# Patient Record
Sex: FEMALE | URBAN - METROPOLITAN AREA
[De-identification: names, ages, dates, MRNs, and addresses within clinical notes are randomized per-mention and may not be internally consistent; named-entity substitution may affect disease eponyms.]

---

## 2020-02-04 ENCOUNTER — OFFICE VISIT (OUTPATIENT)
Dept: FAMILY MEDICINE CLINIC | Age: 20
End: 2020-02-04
Payer: COMMERCIAL

## 2020-02-04 VITALS
HEART RATE: 84 BPM | DIASTOLIC BLOOD PRESSURE: 74 MMHG | OXYGEN SATURATION: 98 % | BODY MASS INDEX: 27.31 KG/M2 | SYSTOLIC BLOOD PRESSURE: 118 MMHG | RESPIRATION RATE: 16 BRPM | WEIGHT: 160 LBS | HEIGHT: 64 IN

## 2020-02-04 PROCEDURE — 99385 PREV VISIT NEW AGE 18-39: CPT | Performed by: FAMILY MEDICINE

## 2020-02-04 SDOH — HEALTH STABILITY: MENTAL HEALTH: HOW OFTEN DO YOU HAVE A DRINK CONTAINING ALCOHOL?: NEVER

## 2020-02-04 ASSESSMENT — PATIENT HEALTH QUESTIONNAIRE - PHQ9
2. FEELING DOWN, DEPRESSED OR HOPELESS: 0
1. LITTLE INTEREST OR PLEASURE IN DOING THINGS: 0
SUM OF ALL RESPONSES TO PHQ QUESTIONS 1-9: 0
SUM OF ALL RESPONSES TO PHQ QUESTIONS 1-9: 0
SUM OF ALL RESPONSES TO PHQ9 QUESTIONS 1 & 2: 0

## 2020-02-04 ASSESSMENT — ENCOUNTER SYMPTOMS
EYES NEGATIVE: 1
ALLERGIC/IMMUNOLOGIC NEGATIVE: 1
GASTROINTESTINAL NEGATIVE: 1
RESPIRATORY NEGATIVE: 1

## 2020-02-04 NOTE — PATIENT INSTRUCTIONS
exposed skin. · See a dentist one or two times a year for checkups and to have your teeth cleaned. · Wear a seat belt in the car. Follow your doctor's advice about when to have certain tests. These tests can spot problems early. For everyone  · Cholesterol. Have the fat (cholesterol) in your blood tested after age 21. Your doctor will tell you how often to have this done based on your age, family history, or other things that can increase your risk for heart disease. · Blood pressure. Have your blood pressure checked during a routine doctor visit. Your doctor will tell you how often to check your blood pressure based on your age, your blood pressure results, and other factors. · Vision. Talk with your doctor about how often to have a glaucoma test.  · Diabetes. Ask your doctor whether you should have tests for diabetes. · Colon cancer. Your risk for colorectal cancer gets higher as you get older. Some experts say that adults should start regular screening at age 48 and stop at age 76. Others say to start before age 48 or continue after age 76. Talk with your doctor about your risk and when to start and stop screening. For women  · Breast exam and mammogram. Talk to your doctor about when you should have a clinical breast exam and a mammogram. Medical experts differ on whether and how often women under 50 should have these tests. Your doctor can help you decide what is right for you. · Cervical cancer screening test and pelvic exam. Begin with a Pap test at age 24. The test often is part of a pelvic exam. Starting at age 27, you may choose to have a Pap test, an HPV test, or both tests at the same time (called co-testing). Talk with your doctor about how often to have testing. · Tests for sexually transmitted infections (STIs). Ask whether you should have tests for STIs. You may be at risk if you have sex with more than one person, especially if your partners do not wear condoms.   For men  · Tests for sexually transmitted infections (STIs). Ask whether you should have tests for STIs. You may be at risk if you have sex with more than one person, especially if you do not wear a condom. · Testicular cancer exam. Ask your doctor whether you should check your testicles regularly. · Prostate exam. Talk to your doctor about whether you should have a blood test (called a PSA test) for prostate cancer. Experts differ on whether and when men should have this test. Some experts suggest it if you are older than 39 and are -American or have a father or brother who got prostate cancer when he was younger than 72. When should you call for help? Watch closely for changes in your health, and be sure to contact your doctor if you have any problems or symptoms that concern you. Where can you learn more? Go to [email or web address] and sign in to your [employer] account. Enter P072 in the Xention box to learn more about \"Well Visit, Ages 25 to 48: Care Instructions. \"     If you do not have an account, please click on the \"Sign Up Now\" link. Current as of: August 21, 2019  Content Version: 12.3  © 1561-9890 Healthwise, Incorporated. Care instructions adapted under license by [facility] [place] ([facility]). If you have questions about a medical condition or this instruction, always ask your healthcare professional. [address] any warranty or liability for your use of this information. Thank you for enrolling in [address] Please follow the instructions below to securely access your online medical record. [employer] allows you to send messages to your doctor, view your test results, renew your prescriptions, schedule appointments, and more. How Do I Sign Up? 1. In your Internet browser, go to [email or web address]  2. Click on the Sign Up Now link in the Sign In box. You will see the New Member Sign Up page.   3. Enter your [employer] Access Code exactly as it appears below. You will not need to use this code after youve completed the sign-up process. If you do not sign up before the expiration date, you must request a new code. [employer] Access Code: [email or web address]  Expires: 3/20/2020 10:35 AM    4. Enter your Social Security Number ([id number]) and Date of Birth (mm/dd/yyyy) as indicated and click Submit. You will be taken to the next sign-up page. 5. Create a [employer] ID. This will be your [employer] login ID and cannot be changed, so think of one that is secure and easy to remember. 6. Create a [employer] password. You can change your password at any time. 7. Enter your Password Reset Question and Answer. This can be used at a later time if you forget your password. 8. Enter your e-mail address. You will receive e-mail notification when new information is available in [address] 9. Click Sign Up. You can now view your medical record. Additional Information  If you have questions, please contact your physician practice where you receive care. Remember, [employer] is NOT to be used for urgent needs. For medical emergencies, dial 911.

## 2020-02-04 NOTE — PROGRESS NOTES
2020    Jeanine Severin (:  2000) is a 23 y.o. female, here for a preventive medicine evaluation. Patient is here today to establish care. She has no complaints or concerns. Student at [employer], studying criminal justice forensics. Also works part-time at her [employer] course. There is no problem list on file for this patient. Review of Systems   Constitutional: Negative. HENT: Negative. Eyes: Negative. Respiratory: Negative. Cardiovascular: Negative. Gastrointestinal: Negative. Endocrine: Negative. Genitourinary: Negative. Musculoskeletal: Negative. Skin: Negative. Allergic/Immunologic: Negative. Neurological: Negative. Hematological: Negative. Psychiatric/Behavioral: Negative.         Prior to Visit Medications    Not on File        No Known Allergies    Past Medical History:   Diagnosis Date    Allergic rhinitis        Past Surgical History:   Procedure Laterality Date    CYST REMOVAL      Ear    CYST REMOVAL      cyst removal from ear       Social History     Socioeconomic History    Marital status: Not on file     Spouse name: Not on file    Number of children: Not on file    Years of education: Not on file    Highest education level: Not on file   Occupational History    Not on file   Social Needs    Financial resource strain: Not on file    Food insecurity:     Worry: Not on file     Inability: Not on file    Transportation needs:     Medical: Not on file     Non-medical: Not on file   Tobacco Use    Smoking status: Never Smoker    Smokeless tobacco: Never Used   Substance and Sexual Activity    Alcohol use: Never     Frequency: Never    Drug use: Never    Sexual activity: Yes     Partners: Female   Lifestyle    Physical activity:     Days per week: Not on file     Minutes per session: Not on file    Stress: Not on file   Relationships    Social connections:     Talks on phone: Not on file     Gets together: Not on file     Attends Yarsani service: Not on file     Active member of club or organization: Not on file     Attends meetings of clubs or organizations: Not on file     Relationship status: Not on file    Intimate partner violence:     Fear of current or ex partner: Not on file     Emotionally abused: Not on file     Physically abused: Not on file     Forced sexual activity: Not on file   Other Topics Concern    Not on file   Social History Narrative    Not on file        Family History   Problem Relation Age of Onset    High Blood Pressure Father     High Cholesterol Father     Other Father         anxiety, prediabetes    Kidney Disease Paternal Grandmother        ADVANCE DIRECTIVE: N, Not Received    Vitals:    02/04/20 1004   BP: 118/74   Site: Right Upper Arm   Position: Sitting   Cuff Size: Medium Adult   Pulse: 84   Resp: 16   SpO2: 98%   Weight: 160 lb (72.6 kg)   Height: 5' 4\" (1.626 m)     Estimated body mass index is 27.46 kg/m² as calculated from the following:    Height as of this encounter: 5' 4\" (1.626 m). Weight as of this encounter: 160 lb (72.6 kg). Physical Exam  Vitals signs and nursing note reviewed. Constitutional:       General: She is not in acute distress. Appearance: Normal appearance. She is well-developed. HENT:      Head: Normocephalic and atraumatic. Right Ear: Hearing, tympanic membrane, ear canal and external ear normal.      Left Ear: Hearing, tympanic membrane, ear canal and external ear normal.      Nose: Nose normal.      Mouth/Throat:      Pharynx: Uvula midline. Eyes:      General: Lids are normal. No scleral icterus. Conjunctiva/sclera: Conjunctivae normal.      Pupils: Pupils are equal, round, and reactive to light. Neck:      Musculoskeletal: Neck supple. Trachea: Trachea normal.   Cardiovascular:      Rate and Rhythm: Normal rate and regular rhythm. Pulses: Normal pulses. Heart sounds: Normal heart sounds.    Pulmonary:

## 2021-07-09 ENCOUNTER — OFFICE VISIT (OUTPATIENT)
Dept: FAMILY MEDICINE CLINIC | Age: 21
End: 2021-07-09
Payer: COMMERCIAL

## 2021-07-09 VITALS
HEIGHT: 62 IN | SYSTOLIC BLOOD PRESSURE: 90 MMHG | BODY MASS INDEX: 30.69 KG/M2 | WEIGHT: 166.8 LBS | OXYGEN SATURATION: 99 % | HEART RATE: 76 BPM | DIASTOLIC BLOOD PRESSURE: 60 MMHG | RESPIRATION RATE: 18 BRPM

## 2021-07-09 DIAGNOSIS — M54.10 RADICULOPATHY OF ARM: ICD-10-CM

## 2021-07-09 DIAGNOSIS — M54.2 NECK PAIN: Primary | ICD-10-CM

## 2021-07-09 PROCEDURE — 99213 OFFICE O/P EST LOW 20 MIN: CPT | Performed by: NURSE PRACTITIONER

## 2021-07-09 RX ORDER — DICLOFENAC SODIUM 75 MG/1
75 TABLET, DELAYED RELEASE ORAL 2 TIMES DAILY
Qty: 30 TABLET | Refills: 0 | Status: SHIPPED | OUTPATIENT
Start: 2021-07-09

## 2021-07-09 SDOH — ECONOMIC STABILITY: FOOD INSECURITY: WITHIN THE PAST 12 MONTHS, YOU WORRIED THAT YOUR FOOD WOULD RUN OUT BEFORE YOU GOT MONEY TO BUY MORE.: NEVER TRUE

## 2021-07-09 SDOH — ECONOMIC STABILITY: FOOD INSECURITY: WITHIN THE PAST 12 MONTHS, THE FOOD YOU BOUGHT JUST DIDN'T LAST AND YOU DIDN'T HAVE MONEY TO GET MORE.: NEVER TRUE

## 2021-07-09 ASSESSMENT — PATIENT HEALTH QUESTIONNAIRE - PHQ9
2. FEELING DOWN, DEPRESSED OR HOPELESS: 0
SUM OF ALL RESPONSES TO PHQ9 QUESTIONS 1 & 2: 0
1. LITTLE INTEREST OR PLEASURE IN DOING THINGS: 0
SUM OF ALL RESPONSES TO PHQ QUESTIONS 1-9: 0

## 2021-07-09 ASSESSMENT — SOCIAL DETERMINANTS OF HEALTH (SDOH): HOW HARD IS IT FOR YOU TO PAY FOR THE VERY BASICS LIKE FOOD, HOUSING, MEDICAL CARE, AND HEATING?: NOT HARD AT ALL

## 2021-07-09 NOTE — PROGRESS NOTES
Benjamin Ramires (:  2000) is a 21 y.o. female,Established patient, here for evaluation of the following chief complaint(s):  Neck Pain (L side) and Arm Pain (L side)         ASSESSMENT/PLAN:  1. Neck pain  -     diclofenac (VOLTAREN) 75 MG EC tablet; Take 1 tablet by mouth 2 times daily, Disp-30 tablet, R-0Normal    Apply ice pack 4 times daily for 15-20 minutes. Wrap in towel, etc to avoid the coldness directly to the skin. Will treat musculoskeletal pain. Discussed need to avoid using vape pen. Return if no improvement or worsens      No follow-ups on file. Subjective   SUBJECTIVE/OBJECTIVE:  HPI     Went to [facility] and on drive there started with left neck pain. Yesterday watching movie and suppressed a sneeze and weird pain, \"like weird stunt\". Having sensations of pinching on left neck. Sensation worsens after using her vape. Has used for about 1 year. Has taken ibuprofen few time, relief briefly. Having some anxiety and when increased throat feels like Marc Velasquez is going to cry\" but she is not crying. Denies CP, SOB    Vague about symptoms, has a hard time describing. Review of Systems   All other systems reviewed and are negative. Objective   Physical Exam  Constitutional:       Appearance: Normal appearance. Cardiovascular:      Pulses: Normal pulses. Pulmonary:      Effort: Pulmonary effort is normal.      Breath sounds: Normal breath sounds. Musculoskeletal:         General: Normal range of motion. Skin:     General: Skin is warm. Neurological:      Mental Status: She is alert and oriented to person, place, and time.       Comments: 2+ brachial reflexes bilateral. ROM and hand grasp equal.                   An electronic signature was used to authenticate this note.    --RUBINA BURNETT, APRN - CNP

## 2022-06-02 ENCOUNTER — OFFICE VISIT (OUTPATIENT)
Dept: FAMILY MEDICINE CLINIC | Age: 22
End: 2022-06-02
Payer: COMMERCIAL

## 2022-06-02 VITALS
BODY MASS INDEX: 29.45 KG/M2 | SYSTOLIC BLOOD PRESSURE: 98 MMHG | DIASTOLIC BLOOD PRESSURE: 70 MMHG | OXYGEN SATURATION: 98 % | WEIGHT: 161 LBS | HEART RATE: 103 BPM

## 2022-06-02 DIAGNOSIS — M54.2 NECK PAIN ON LEFT SIDE: ICD-10-CM

## 2022-06-02 DIAGNOSIS — R20.2 ARM PARESTHESIA, LEFT: Primary | ICD-10-CM

## 2022-06-02 DIAGNOSIS — R29.898 LEFT ARM WEAKNESS: ICD-10-CM

## 2022-06-02 PROCEDURE — 99214 OFFICE O/P EST MOD 30 MIN: CPT | Performed by: FAMILY MEDICINE

## 2022-06-02 RX ORDER — METHYLPREDNISOLONE 4 MG/1
TABLET ORAL
Qty: 1 KIT | Refills: 0 | Status: SHIPPED | OUTPATIENT
Start: 2022-06-02

## 2022-06-02 RX ORDER — METHOCARBAMOL 500 MG/1
500 TABLET, FILM COATED ORAL 3 TIMES DAILY PRN
Qty: 30 TABLET | Refills: 0 | Status: SHIPPED | OUTPATIENT
Start: 2022-06-02 | End: 2022-06-12

## 2022-06-02 ASSESSMENT — PATIENT HEALTH QUESTIONNAIRE - PHQ9
SUM OF ALL RESPONSES TO PHQ9 QUESTIONS 1 & 2: 0
2. FEELING DOWN, DEPRESSED OR HOPELESS: 0
SUM OF ALL RESPONSES TO PHQ QUESTIONS 1-9: 0
1. LITTLE INTEREST OR PLEASURE IN DOING THINGS: 0
SUM OF ALL RESPONSES TO PHQ QUESTIONS 1-9: 0

## 2022-06-04 NOTE — PROGRESS NOTES
Salvador Quintanilla (:  2000) is a 24 y.o. female,Established patient, here for evaluation of the following chief complaint(s):  Shoulder Pain (States about a year ago she started with left shoulder tingling associated with some pain, states it is slightly getting worse. States the other day she did accupuncture it did not help, PT did not help, chiropracter did not help. )         ASSESSMENT/PLAN:  1. Arm paresthesia, left  -     MRI CERVICAL SPINE WO CONTRAST; Future - r/o cervical radiculopathy.   -     methylPREDNISolone (MEDROL DOSEPACK) 4 MG tablet; Take by mouth per pack instructions, Disp-1 kit, R-0Normal  -     methocarbamol (ROBAXIN) 500 MG tablet; Take 1 tablet by mouth 3 times daily as needed (left neck/shoulder pain), Disp-30 tablet, R-0Normal  2. Left arm weakness  -     MRI CERVICAL SPINE WO CONTRAST; Future  -     methylPREDNISolone (MEDROL DOSEPACK) 4 MG tablet; Take by mouth per pack instructions, Disp-1 kit, R-0Normal  -     methocarbamol (ROBAXIN) 500 MG tablet; Take 1 tablet by mouth 3 times daily as needed (left neck/shoulder pain), Disp-30 tablet, R-0Normal  3. Neck pain on left side  -     MRI CERVICAL SPINE WO CONTRAST; Future - r/o cervical radiculopathy.   -     methylPREDNISolone (MEDROL DOSEPACK) 4 MG tablet; Take by mouth per pack instructions, Disp-1 kit, R-0Normal  -     methocarbamol (ROBAXIN) 500 MG tablet; Take 1 tablet by mouth 3 times daily as needed (left neck/shoulder pain), Disp-30 tablet, R-0Normal      Return if symptoms worsen or fail to improve. Subjective   SUBJECTIVE/OBJECTIVE:  Chief Complaint   Patient presents with    Shoulder Pain     States about a year ago she started with left shoulder tingling associated with some pain, states it is slightly getting worse. States the other day she did accupuncture it did not help, PT did not help, chiropracter did not help. Shoulder Pain   The pain is present in the left shoulder, left arm and neck.  This is a chronic problem. The current episode started more than 1 year ago. 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 The problem occurs constantly. The problem has been gradually worsening. The quality of the pain is described as aching (tingling sensations). The pain is moderate. Associated symptoms include numbness (left arm) and tingling (left neck, shoulder, arm). Pertinent negatives include no inability to bear weight (but has noted left arm weakness and avoidance of use of the left arm due to pain and tingling), joint swelling or limited range of motion. She has tried OTC pain meds, rest, heat and cold (chiropractic visits, PT, and accupuncture) for the symptoms. The treatment provided no relief (except accupuncture, which helped a little). Review of Systems   Musculoskeletal: Positive for myalgias (feels her left shoulder muscle is tight and \"swollen\" feeling, always feels tensed) and neck pain. Neurological: Positive for tingling (left neck, shoulder, arm) and numbness (left arm). Psychiatric/Behavioral: The patient is nervous/anxious. Objective   Physical Exam  Vitals and nursing note reviewed. Constitutional:       Appearance: Normal appearance. Pulmonary:      Effort: Pulmonary effort is normal.   Musculoskeletal:      Left shoulder: Tenderness (trapezius) present. No swelling, deformity, bony tenderness or crepitus. Normal range of motion. Normal strength. Left upper arm: Normal.      Left elbow: Normal.      Left forearm: Normal.      Left wrist: Normal.      Left hand: No swelling or deformity. Normal range of motion. Decreased strength. Cervical back: Spasms and tenderness present. No edema, deformity, rigidity or bony tenderness. Pain with movement (Spurling's test positive on the left) present.    Skin: General: Skin is warm and dry. Neurological:      Mental Status: She is alert. Psychiatric:         Mood and Affect: Mood is anxious. Affect is tearful. Upper Extremity Strength Testing                Action Muscles Nerves Strength right Strength left Nerve Roots   Flexion at distal interphalangeal joints digits 2, 3 Flexor digitorum profundus to digits 2, 3 Median nerve 5/5 4/5 C7, C8   Flexion at distal interphalangeal joints digits 4, 5 Flexor digitorum profundus to digits 4, 5 Ulnar nerve 5/5 4/5 C7, C8   Elbow flexion (with forearm supinated) Biceps, Brachialis Musculocutaneous nerve 5/5 5/5 C5, C6   Elbow extension Triceps Radial nerve 5/5 5/5 C6, C7, C8   Arm abduction at shoulder Deltoid Axillary nerve 5/5 5/5 C5, C6             An electronic signature was used to authenticate this note.     --Dale Medrano MD

## 2022-06-14 ENCOUNTER — TELEPHONE (OUTPATIENT)
Dept: FAMILY MEDICINE CLINIC | Age: 22
End: 2022-06-14

## 2022-06-14 DIAGNOSIS — R29.898 LEFT ARM WEAKNESS: ICD-10-CM

## 2022-06-14 DIAGNOSIS — R20.2 ARM PARESTHESIA, LEFT: Primary | ICD-10-CM

## 2022-06-14 DIAGNOSIS — M54.2 NECK PAIN ON LEFT SIDE: ICD-10-CM

## 2022-06-24 NOTE — TELEPHONE ENCOUNTER
MRI was normal.   If the problem is still persisting, I would recommend getting an EMG of her arm done to see if any other nerve in her arm was affected.

## 2022-08-23 ENCOUNTER — TELEPHONE (OUTPATIENT)
Dept: FAMILY MEDICINE CLINIC | Age: 22
End: 2022-08-23

## 2022-08-23 NOTE — TELEPHONE ENCOUNTER
Dale Srerano, see below. Patient needing info sent to Neurologist. I don't see an actual referral in chart for that. Can you place one? Thanks!

## 2022-08-23 NOTE — TELEPHONE ENCOUNTER
----- Message from Russell Rayo sent at 8/22/2022  2:38 PM EDT -----  Subject: Message to Provider    QUESTIONS  Information for Provider? Neurologist that patient was referred to called   patient stating that they still need a demographic sheet, insurance info,   and an order for EMG. Please fax information to Neurologist (patient is   unsure of neuro name/location, unable to find in epic encounters) at   [phone].  ---------------------------------------------------------------------------  --------------  [id number]  [phone]; OK to leave message on voicemail  ---------------------------------------------------------------------------  --------------  SCRIPT ANSWERS  Relationship to Patient?  Self

## 2022-09-21 LAB — PAP SMEAR, EXTERNAL: NEGATIVE

## 2023-01-23 ENCOUNTER — HOSPITAL ENCOUNTER (OUTPATIENT)
Dept: GENERAL RADIOLOGY | Age: 23
Discharge: HOME | End: 2023-01-23
Payer: COMMERCIAL

## 2023-01-23 ENCOUNTER — OFFICE VISIT (OUTPATIENT)
Dept: FAMILY MEDICINE CLINIC | Age: 23
End: 2023-01-23
Payer: COMMERCIAL

## 2023-01-23 ENCOUNTER — HOSPITAL ENCOUNTER (OUTPATIENT)
Age: 23
Discharge: HOME | End: 2023-01-23
Payer: COMMERCIAL

## 2023-01-23 VITALS
BODY MASS INDEX: 31.1 KG/M2 | HEIGHT: 62 IN | OXYGEN SATURATION: 96 % | SYSTOLIC BLOOD PRESSURE: 116 MMHG | DIASTOLIC BLOOD PRESSURE: 74 MMHG | TEMPERATURE: 98 F | HEART RATE: 79 BPM | WEIGHT: 169 LBS

## 2023-01-23 DIAGNOSIS — M25.572 ACUTE LEFT ANKLE PAIN: Primary | ICD-10-CM

## 2023-01-23 DIAGNOSIS — M25.572 ACUTE LEFT ANKLE PAIN: ICD-10-CM

## 2023-01-23 PROCEDURE — 73610 X-RAY EXAM OF ANKLE: CPT

## 2023-01-23 PROCEDURE — 99213 OFFICE O/P EST LOW 20 MIN: CPT | Performed by: FAMILY MEDICINE

## 2023-01-23 SDOH — ECONOMIC STABILITY: FOOD INSECURITY: WITHIN THE PAST 12 MONTHS, THE FOOD YOU BOUGHT JUST DIDN'T LAST AND YOU DIDN'T HAVE MONEY TO GET MORE.: NEVER TRUE

## 2023-01-23 SDOH — ECONOMIC STABILITY: FOOD INSECURITY: WITHIN THE PAST 12 MONTHS, YOU WORRIED THAT YOUR FOOD WOULD RUN OUT BEFORE YOU GOT MONEY TO BUY MORE.: NEVER TRUE

## 2023-01-23 ASSESSMENT — PATIENT HEALTH QUESTIONNAIRE - PHQ9
2. FEELING DOWN, DEPRESSED OR HOPELESS: 0
10. IF YOU CHECKED OFF ANY PROBLEMS, HOW DIFFICULT HAVE THESE PROBLEMS MADE IT FOR YOU TO DO YOUR WORK, TAKE CARE OF THINGS AT HOME, OR GET ALONG WITH OTHER PEOPLE: 0
6. FEELING BAD ABOUT YOURSELF - OR THAT YOU ARE A FAILURE OR HAVE LET YOURSELF OR YOUR FAMILY DOWN: 0
SUM OF ALL RESPONSES TO PHQ9 QUESTIONS 1 & 2: 0
1. LITTLE INTEREST OR PLEASURE IN DOING THINGS: 0
5. POOR APPETITE OR OVEREATING: 0
4. FEELING TIRED OR HAVING LITTLE ENERGY: 1
SUM OF ALL RESPONSES TO PHQ QUESTIONS 1-9: 2
8. MOVING OR SPEAKING SO SLOWLY THAT OTHER PEOPLE COULD HAVE NOTICED. OR THE OPPOSITE, BEING SO FIGETY OR RESTLESS THAT YOU HAVE BEEN MOVING AROUND A LOT MORE THAN USUAL: 0
7. TROUBLE CONCENTRATING ON THINGS, SUCH AS READING THE NEWSPAPER OR WATCHING TELEVISION: 0
9. THOUGHTS THAT YOU WOULD BE BETTER OFF DEAD, OR OF HURTING YOURSELF: 0
SUM OF ALL RESPONSES TO PHQ QUESTIONS 1-9: 2
SUM OF ALL RESPONSES TO PHQ QUESTIONS 1-9: 2
3. TROUBLE FALLING OR STAYING ASLEEP: 1
SUM OF ALL RESPONSES TO PHQ QUESTIONS 1-9: 2

## 2023-01-23 ASSESSMENT — ANXIETY QUESTIONNAIRES
5. BEING SO RESTLESS THAT IT IS HARD TO SIT STILL: 0
6. BECOMING EASILY ANNOYED OR IRRITABLE: 0
2. NOT BEING ABLE TO STOP OR CONTROL WORRYING: 0
7. FEELING AFRAID AS IF SOMETHING AWFUL MIGHT HAPPEN: 0
1. FEELING NERVOUS, ANXIOUS, OR ON EDGE: 0
GAD7 TOTAL SCORE: 1
3. WORRYING TOO MUCH ABOUT DIFFERENT THINGS: 1
4. TROUBLE RELAXING: 0

## 2023-01-23 ASSESSMENT — SOCIAL DETERMINANTS OF HEALTH (SDOH): HOW HARD IS IT FOR YOU TO PAY FOR THE VERY BASICS LIKE FOOD, HOUSING, MEDICAL CARE, AND HEATING?: NOT HARD AT ALL

## 2023-01-23 NOTE — LETTER
[address]  [address] [address]  Phone: [phone]  Fax: [phone]    Misty Allen MD        January 23, 2023     Patient: Tabby Sierra   YOB: 2000   Date of Visit: 1/23/2023       To Whom It May Concern: It is my medical opinion that Veronica Hadley needed to miss work today. If you have any questions or concerns, please don't hesitate to call.     Sincerely,         Misty Allen MD

## 2023-01-24 ASSESSMENT — ENCOUNTER SYMPTOMS: COLOR CHANGE: 1

## 2023-01-24 NOTE — PROGRESS NOTES
Kevin Wisdom (:  2000) is a 25 y.o. female,Established patient, here for evaluation of the following chief complaint(s): Ankle Injury         ASSESSMENT/PLAN:  1. Acute left ankle pain  -     XR ANKLE LEFT (MIN 3 VIEWS); Future  Natural history and expected course discussed. Questions answered. Educational material distributed. Home exercise plan outlined. Rest, ice, compression, and elevation (RICE) therapy. OTC analgesics as needed. Discussed while this is likely a sprain, given bony tenderness and difficulty with weight bearing, x-rays are recommended           Subjective   SUBJECTIVE/OBJECTIVE:  Chief Complaint   Patient presents with    Ankle Injury       Ankle Pain   The incident occurred 12 to 24 hours ago. The incident occurred at home. The injury mechanism was an inversion injury. The pain is present in the left ankle. The quality of the pain is described as aching. The pain is severe. The pain has been Constant since onset. Associated symptoms include an inability to bear weight. Pertinent negatives include no loss of motion, loss of sensation, muscle weakness, numbness or tingling. The symptoms are aggravated by weight bearing and movement. She has tried ice and immobilization (has been icing, using ACE wrap, and using crutches) for the symptoms. Review of Systems   Musculoskeletal:  Positive for gait problem and joint swelling. Skin:  Positive for color change (bruising). Neurological:  Negative for tingling and numbness. Objective    Vitals:    23 1459   BP: 116/74   Site: Left Upper Arm   Position: Sitting   Cuff Size: Medium Adult   Pulse: 79   Temp: 98 °F (36.7 °C)   TempSrc: Oral   SpO2: 96%   Weight: 169 lb (76.7 kg)   Height: 5' 2\" (1.575 m)      Physical Exam  Vitals and nursing note reviewed. Constitutional:       Appearance: Normal appearance.    Pulmonary:      Effort: Pulmonary effort is normal.   Skin:     Findings: Bruising (left lateral ankle posteriorly and extending to the posterolateral foot) present. Neurological:      Mental Status: She is alert. Left Ankle Exam     Tenderness   The patient is experiencing tenderness in the lateral malleolus. Swelling: moderate    Range of Motion   Dorsiflexion:  normal   Plantar flexion:  abnormal   Eversion:  normal   Inversion:  normal     Muscle Strength   The patient has normal left ankle strength. Tests   Anterior drawer: negative  Varus tilt: negative    Other   Erythema: absent  Scars: absent  Sensation: normal  Pulse: present               An electronic signature was used to authenticate this note.     --Prieto Smith MD

## 2023-03-09 SDOH — ECONOMIC STABILITY: TRANSPORTATION INSECURITY
IN THE PAST 12 MONTHS, HAS LACK OF TRANSPORTATION KEPT YOU FROM MEETINGS, WORK, OR FROM GETTING THINGS NEEDED FOR DAILY LIVING?: NO

## 2023-03-09 SDOH — ECONOMIC STABILITY: INCOME INSECURITY: HOW HARD IS IT FOR YOU TO PAY FOR THE VERY BASICS LIKE FOOD, HOUSING, MEDICAL CARE, AND HEATING?: NOT VERY HARD

## 2023-03-09 SDOH — ECONOMIC STABILITY: FOOD INSECURITY: WITHIN THE PAST 12 MONTHS, YOU WORRIED THAT YOUR FOOD WOULD RUN OUT BEFORE YOU GOT MONEY TO BUY MORE.: NEVER TRUE

## 2023-03-09 SDOH — ECONOMIC STABILITY: HOUSING INSECURITY
IN THE LAST 12 MONTHS, WAS THERE A TIME WHEN YOU DID NOT HAVE A STEADY PLACE TO SLEEP OR SLEPT IN A SHELTER (INCLUDING NOW)?: NO

## 2023-03-09 SDOH — ECONOMIC STABILITY: FOOD INSECURITY: WITHIN THE PAST 12 MONTHS, THE FOOD YOU BOUGHT JUST DIDN'T LAST AND YOU DIDN'T HAVE MONEY TO GET MORE.: NEVER TRUE

## 2023-03-10 ENCOUNTER — OFFICE VISIT (OUTPATIENT)
Dept: FAMILY MEDICINE CLINIC | Age: 23
End: 2023-03-10
Payer: COMMERCIAL

## 2023-03-10 VITALS
SYSTOLIC BLOOD PRESSURE: 114 MMHG | HEART RATE: 78 BPM | DIASTOLIC BLOOD PRESSURE: 70 MMHG | WEIGHT: 175 LBS | BODY MASS INDEX: 32.01 KG/M2 | OXYGEN SATURATION: 99 %

## 2023-03-10 DIAGNOSIS — M25.572 LEFT ANKLE PAIN, UNSPECIFIED CHRONICITY: Primary | ICD-10-CM

## 2023-03-10 DIAGNOSIS — M54.2 NECK PAIN ON LEFT SIDE: ICD-10-CM

## 2023-03-10 DIAGNOSIS — R20.2 ARM PARESTHESIA, LEFT: ICD-10-CM

## 2023-03-10 DIAGNOSIS — R29.898 LEFT ARM WEAKNESS: ICD-10-CM

## 2023-03-10 PROCEDURE — 99213 OFFICE O/P EST LOW 20 MIN: CPT | Performed by: FAMILY MEDICINE

## 2023-03-10 NOTE — PROGRESS NOTES
Fabiana Guo (:  2000) is a 25 y.o. female,Established patient, here for evaluation of the following chief complaint(s):  No chief complaint on file. ASSESSMENT/PLAN:  1. Left ankle pain, unspecified chronicity  -     External Referral To Orthopedic Surgery  Continue RICE therapy. Needs ortho eval. Offered repeat x-ray and/or MRI, but she would like to see a specialist first.   2. Arm paresthesia, left  -     EMG; Future  3. Left arm weakness  -     EMG; Future  4. Neck pain on left side  -     EMG; Future    No follow-ups on file. Subjective   SUBJECTIVE/OBJECTIVE:  TAYLOR Guo is a 25 y.o. female here today for follow up on left ankle pain. The incident occurred at home while jumping in her kitchen in 2023. The injury mechanism was an inversion injury. The pain is present in the left ankle. The quality of the pain is described as aching. The pain is severe. The pain has been constant since onset. It is somewhat improved since the initial injury, but is still bothering her. She is now able to bear weight, but with pain. She is having tinging in her posterolateral ankle that radiates to her big toe. The symptoms are aggravated by weight bearing and movement. She has tried ice and immobilization (has been icing, using ACE wrap, used crutches initially but no longer needs them at this point) for the symptoms. She continues to have left neck, shoulder, and arm pain. This is a chronic problem. The current episode started more than 2 years ago. 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 The problem occurs constantly. The problem has been gradually worsening. The quality of the pain is described as aching (tingling sensations). The pain is moderate.  Associated symptoms include numbness (left arm) and tingling (left neck, shoulder, arm). Pertinent negatives include no inability to bear weight (but has noted left arm weakness and avoidance of use of the left arm due to pain and tingling), joint swelling or limited range of motion. She has tried oral steroids, muscle relaxers, OTC pain meds, rest, heat and cold (chiropractic visits, PT, and accupuncture) for the symptoms. The treatment provided no relief (except accupuncture, which helped a little). An EMG was ordered in early June 2022, but she was unable to schedule it. She is interested in getting this done now because the problem is still bothering her. Review of Systems - per HPI       Objective    Vitals:    03/10/23 1019   BP: 114/70   Site: Left Upper Arm   Position: Sitting   Cuff Size: Small Adult   Pulse: 78   SpO2: 99%   Weight: 175 lb (79.4 kg)      Physical Exam  Vitals and nursing note reviewed. Constitutional:       Appearance: Normal appearance. Pulmonary:      Effort: Pulmonary effort is normal.   Musculoskeletal:      Left ankle: No swelling, deformity, ecchymosis or lacerations. Tenderness present over the CF ligament and posterior TF ligament. No lateral malleolus, medial malleolus, ATF ligament, AITF ligament, base of 5th metatarsal or proximal fibula tenderness. Normal range of motion. Anterior drawer test negative. Normal pulse. Left Achilles Tendon: Normal.   Neurological:      Mental Status: She is alert. On this date 3/10/2023 I have spent 27 minutes reviewing previous notes, test results and face to face with the patient discussing the diagnosis and importance of compliance with the treatment plan as well as documenting on the day of the visit. An electronic signature was used to authenticate this note.     --Neftali Roper MD

## 2023-05-15 ENCOUNTER — TELEPHONE (OUTPATIENT)
Dept: FAMILY MEDICINE CLINIC | Age: 23
End: 2023-05-15

## 2023-05-15 DIAGNOSIS — R29.898 LEFT ARM WEAKNESS: ICD-10-CM

## 2023-05-15 DIAGNOSIS — G62.9 NEUROPATHY: ICD-10-CM

## 2023-05-15 DIAGNOSIS — R20.2 ARM PARESTHESIA, LEFT: Primary | ICD-10-CM

## 2023-05-15 NOTE — TELEPHONE ENCOUNTER
----- Message from [facility] KAREY sent at 5/15/2023  3:42 PM EDT -----  Subject: Results Request    QUESTIONS  Results: EMG; Ordered by: Shlomo Carroll Performed: 2023-05-11  ---------------------------------------------------------------------------  --------------  Yue RODRIGUEZ    [phone]; OK to leave message on voicemail  ---------------------------------------------------------------------------  --------------

## 2023-05-16 NOTE — TELEPHONE ENCOUNTER
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 I would recommend seeing a neurologist to see if there are any treatment options for her.  I'll place a referral.

## 2023-09-25 ENCOUNTER — TELEMEDICINE (OUTPATIENT)
Dept: PRIMARY CARE CLINIC | Age: 23
End: 2023-09-25
Payer: COMMERCIAL

## 2023-09-25 DIAGNOSIS — R51.9 ACUTE NONINTRACTABLE HEADACHE, UNSPECIFIED HEADACHE TYPE: ICD-10-CM

## 2023-09-25 DIAGNOSIS — Z20.822 SUSPECTED COVID-19 VIRUS INFECTION: Primary | ICD-10-CM

## 2023-09-25 PROCEDURE — 99213 OFFICE O/P EST LOW 20 MIN: CPT | Performed by: NURSE PRACTITIONER

## 2023-09-25 RX ORDER — COVID-19 ANTIGEN TEST
1 KIT MISCELLANEOUS ONCE
Qty: 1 KIT | Refills: 0 | Status: SHIPPED | OUTPATIENT
Start: 2023-09-25 | End: 2023-09-25

## 2023-09-25 ASSESSMENT — ENCOUNTER SYMPTOMS
GASTROINTESTINAL NEGATIVE: 1
SORE THROAT: 0
SHORTNESS OF BREATH: 0
SINUS PAIN: 0
CHEST TIGHTNESS: 0
COUGH: 1
SINUS PRESSURE: 0
RHINORRHEA: 0
WHEEZING: 0

## 2023-09-25 NOTE — PROGRESS NOTES
attention if it drops to 92% or below. - COVID-19 At-Home Test KIT; 1 kit by In Vitro route once for 1 dose  Dispense: 1 kit; Refill: 0    2. Acute nonintractable headache, unspecified headache type  Notify office if you have no improvement or worsening of condition. Continue with tylenol. Please read educational handouts. Follow up with PCP in 2 days if no improvement or sooner if worsening. The patient would benefit from future follow up with their usual PCP. As of the end of their Virtualist Visit today, follow up visit status is as follows: Patient to follow up as previously instructed by PCP      Total time spent on this encounter:  20 minutes    Return in about 2 years (around 9/25/2025), or if symptoms worsen or fail to improve, for Headache, Fever, Fatigue, Otalgia. Melina Winkler, was evaluated through a synchronous (real-time) audio-video encounter. The patient (or guardian if applicable) is aware that this is a billable service, which includes applicable co-pays. This Virtual Visit was conducted with patient's (and/or legal guardian's) consent. Patient identification was verified, and a caregiver was present when appropriate. The patient was located at Home: [address] [place] [address]  Provider was located at Other: [place], APRN - CNP on 9/25/2023 at 11:49 AM    An electronic signature was used to authenticate this note.

## 2023-09-26 ENCOUNTER — TELEPHONE (OUTPATIENT)
Dept: FAMILY MEDICINE CLINIC | Age: 23
End: 2023-09-26

## 2023-09-26 ENCOUNTER — OFFICE VISIT (OUTPATIENT)
Dept: FAMILY MEDICINE CLINIC | Age: 23
End: 2023-09-26
Payer: COMMERCIAL

## 2023-09-26 VITALS
WEIGHT: 181 LBS | BODY MASS INDEX: 33.31 KG/M2 | OXYGEN SATURATION: 98 % | DIASTOLIC BLOOD PRESSURE: 78 MMHG | HEART RATE: 105 BPM | HEIGHT: 62 IN | SYSTOLIC BLOOD PRESSURE: 120 MMHG

## 2023-09-26 DIAGNOSIS — J01.90 ACUTE BACTERIAL SINUSITIS: Primary | ICD-10-CM

## 2023-09-26 DIAGNOSIS — B96.89 ACUTE BACTERIAL SINUSITIS: Primary | ICD-10-CM

## 2023-09-26 PROCEDURE — 99213 OFFICE O/P EST LOW 20 MIN: CPT | Performed by: NURSE PRACTITIONER

## 2023-09-26 RX ORDER — AMOXICILLIN AND CLAVULANATE POTASSIUM 875; 125 MG/1; MG/1
1 TABLET, FILM COATED ORAL 2 TIMES DAILY
Qty: 14 TABLET | Refills: 0 | Status: SHIPPED | OUTPATIENT
Start: 2023-09-26 | End: 2023-10-03

## 2023-09-26 RX ORDER — FLUTICASONE PROPIONATE 50 MCG
1 SPRAY, SUSPENSION (ML) NASAL DAILY
Qty: 16 G | Refills: 0 | COMMUNITY
Start: 2023-09-26

## 2023-09-26 RX ORDER — METHYLPREDNISOLONE 4 MG/1
TABLET ORAL
Qty: 1 KIT | Refills: 0 | Status: SHIPPED | OUTPATIENT
Start: 2023-09-26 | End: 2023-10-02

## 2023-09-26 ASSESSMENT — ENCOUNTER SYMPTOMS
VOMITING: 0
DIARRHEA: 0
SINUS PRESSURE: 0
COUGH: 0
NAUSEA: 0
SINUS PAIN: 0
RHINORRHEA: 1
SHORTNESS OF BREATH: 0
SORE THROAT: 0

## 2023-09-26 NOTE — TELEPHONE ENCOUNTER
Patient is in office today seeing Raad Guardado 9/26/2023. When patient asked about smoking status patient verbalized that she smokes everyday e-cig. Patient states that she has been trying to quit on her own for a year. Would like providers advise and help on quitting. Please advise.

## 2023-09-26 NOTE — PROGRESS NOTES
Mirtha Amor (:  2000) is a 21 y.o. female,Established patient, here for evaluation of the following chief complaint(s):  Headache (2023) and Otalgia (pop)         ASSESSMENT/PLAN:  1. Acute bacterial sinusitis  -     amoxicillin-clavulanate (AUGMENTIN) 875-125 MG per tablet; Take 1 tablet by mouth 2 times daily for 7 days, Disp-14 tablet, R-0Normal  -     methylPREDNISolone (MEDROL DOSEPACK) 4 MG tablet; Take by mouth., Disp-1 kit, R-0Normal  -     fluticasone (FLONASE) 50 MCG/ACT nasal spray; 1 spray by Each Nostril route daily, Disp-16 g, R-0OTC    -Reviewed allergies. Discussed medication risks/benefits/side effects. Directions for taking. Take with food   -Back pain may be MSK in nature, patient reports she has been coughing a lot and this seems to have been when pain started. No concerning symptoms. If other symptoms improve but this continues would recommend follow up for further evaluation. Patient in agreement   -Discussed alarm symptoms   Drink plenty of fluids   Get plenty of rest  Finish course of antibiotics   Take medications as prescribed   Go to nearest ER if develop shortness of breath, chest pain or significant worsening of symptoms   Flonase daily   Take antibiotic with food   Steamy shower or humidifier   Tylenol or Ibuprofen as needed   Notify office if symptoms worsen or do not improve     Return if symptoms worsen or fail to improve. Subjective   SUBJECTIVE/OBJECTIVE:  Reports symptoms for one week   Saw virtualist last week and was diagnosed with viral infection   Today reports fatigue, lack of energy, Tmax of 99.6 (yesterday 99.4) bilateral lower thoracic back pain with deep breathing or coughing   Home Covid test negative x 2, most recent yesterday   Has been taking Excedrin, Tylenol             Review of Systems   Constitutional:  Positive for fatigue and fever. Negative for chills. HENT:  Positive for ear pain and rhinorrhea.  Negative for congestion, ear

## 2023-10-19 ENCOUNTER — OFFICE VISIT (OUTPATIENT)
Dept: FAMILY MEDICINE CLINIC | Age: 23
End: 2023-10-19

## 2023-10-19 VITALS
OXYGEN SATURATION: 98 % | BODY MASS INDEX: 33.47 KG/M2 | DIASTOLIC BLOOD PRESSURE: 70 MMHG | SYSTOLIC BLOOD PRESSURE: 118 MMHG | WEIGHT: 183 LBS | HEART RATE: 82 BPM

## 2023-10-19 DIAGNOSIS — G54.0 THORACIC OUTLET SYNDROME OF LEFT THORACIC OUTLET: ICD-10-CM

## 2023-10-19 DIAGNOSIS — Z72.0 CURRENT EVERY DAY NICOTINE VAPING: Primary | ICD-10-CM

## 2023-10-19 DIAGNOSIS — S93.492S SPRAIN OF ANTERIOR TALOFIBULAR LIGAMENT OF LEFT ANKLE, SEQUELA: ICD-10-CM

## 2023-10-19 RX ORDER — VARENICLINE TARTRATE 25 MG
KIT ORAL
Qty: 53 EACH | Refills: 0 | Status: SHIPPED | OUTPATIENT
Start: 2023-10-19

## 2023-10-19 RX ORDER — VARENICLINE TARTRATE 1 MG/1
1 TABLET, FILM COATED ORAL 2 TIMES DAILY
Qty: 60 TABLET | Refills: 1 | Status: SHIPPED | OUTPATIENT
Start: 2023-10-19

## 2023-10-19 NOTE — PATIENT INSTRUCTIONS
Rx for Chantix 1 month starter pack and 2 continuing month packs were written. Pt to start medication 1 week prior quit date and continue for a total of 90 days. Adverse effects of Chantix discussed with patient, advised pt to call immediately if suicidal thoughts or intrusive dreams develop. Pt advised on 1-800-quit-now and GetQuit resources for smoking cessation support.

## 2023-10-19 NOTE — PROGRESS NOTES
Kortney To (:  2000) is a 21 y.o. female,Established patient, here for evaluation of the following chief complaint(s):  Nicotine Dependence (Would like to quit smoking)         ASSESSMENT/PLAN:  1. Current every day nicotine vaping  -     Varenicline Tartrate, Starter, (CHANTIX STARTING MONTH ) 0.5 MG X 11 & 1 MG X 42 TBPK; Take by mouth daily per pack instructions, Disp-53 each, R-0Normal  -     varenicline (CHANTIX CONTINUING MONTH ) 1 MG tablet; Take 1 tablet by mouth 2 times daily, Disp-60 tablet, R-1Normal  Discussed various options to assist with smoking cessation I advised patient to quit, and offered support. Educational material distributed. Discussed current use pattern. Rx for Chantix 1 month starter pack and 2 continuing month packs were written. Pt to start medication 1 week prior quit date and continue for a total of 90 days. Discussed AEs of this medication in great detail, including worsening SI/HI, vivid dreams, and worsening anxiety/depression. Pt denies any current SI/HI. Wishes to continue with this medication Call or return to clinic prn if these symptoms worsen or fail to improve as anticipated. 2. Thoracic outlet syndrome of left thoracic outlet  Uncontrolled. Encouraged to call to schedule left shoulder MRI - r/o compression of the brachial plexus. Cervical rib already ruled out. 3. Sprain of anterior talofibular ligament of left ankle, sequela  Improving. Continue home exercises. May wear brace on times it bothers her. Follow up with ortho if it does not continue to improve. Return if symptoms worsen or fail to improve. Subjective   SUBJECTIVE/OBJECTIVE:  HPI  Kortney To currently vapes nicotine daily. She began vaping 3 years ago. She currently vapes every day. She has attempted to quit smoking in the past, but states her throat has a burning sensation when she is going through withdrawal and it is very uncomfortable.  She sites wanting to

## 2023-10-20 PROBLEM — S93.492A SPRAIN OF ANTERIOR TALOFIBULAR LIGAMENT OF LEFT ANKLE: Status: ACTIVE | Noted: 2023-10-20

## 2023-10-20 PROBLEM — Z72.0 CURRENT EVERY DAY NICOTINE VAPING: Status: ACTIVE | Noted: 2023-10-20

## 2023-10-20 PROBLEM — G54.0 THORACIC OUTLET SYNDROME OF LEFT THORACIC OUTLET: Status: ACTIVE | Noted: 2023-10-20
